# Patient Record
Sex: MALE | Race: OTHER | HISPANIC OR LATINO | Employment: FULL TIME | URBAN - METROPOLITAN AREA
[De-identification: names, ages, dates, MRNs, and addresses within clinical notes are randomized per-mention and may not be internally consistent; named-entity substitution may affect disease eponyms.]

---

## 2022-02-06 ENCOUNTER — HISTORICAL (OUTPATIENT)
Dept: ADMINISTRATIVE | Facility: HOSPITAL | Age: 27
End: 2022-02-06

## 2022-12-21 ENCOUNTER — HOSPITAL ENCOUNTER (EMERGENCY)
Facility: HOSPITAL | Age: 27
Discharge: HOME | End: 2022-12-21
Attending: EMERGENCY MEDICINE

## 2022-12-21 VITALS
OXYGEN SATURATION: 100 % | HEIGHT: 64 IN | HEART RATE: 91 BPM | TEMPERATURE: 98 F | RESPIRATION RATE: 20 BRPM | WEIGHT: 224.44 LBS | BODY MASS INDEX: 38.32 KG/M2 | SYSTOLIC BLOOD PRESSURE: 138 MMHG | DIASTOLIC BLOOD PRESSURE: 75 MMHG

## 2022-12-21 DIAGNOSIS — K75.9 HEPATITIS: ICD-10-CM

## 2022-12-21 DIAGNOSIS — R07.9 CHEST PAIN: ICD-10-CM

## 2022-12-21 DIAGNOSIS — R07.89 CHEST WALL PAIN: Primary | ICD-10-CM

## 2022-12-21 LAB
ALBUMIN SERPL-MCNC: 4 G/DL (ref 3.5–5)
ALBUMIN/GLOB SERPL: 1.1 RATIO (ref 1.1–2)
ALP SERPL-CCNC: 86 UNIT/L (ref 40–150)
ALT SERPL-CCNC: 112 UNIT/L (ref 0–55)
AMPHET UR QL SCN: NEGATIVE
APPEARANCE UR: CLEAR
AST SERPL-CCNC: 42 UNIT/L (ref 5–34)
BACTERIA #/AREA URNS AUTO: ABNORMAL /HPF
BARBITURATE SCN PRESENT UR: NEGATIVE
BASOPHILS # BLD AUTO: 0.09 X10(3)/MCL (ref 0–0.2)
BASOPHILS NFR BLD AUTO: 0.5 %
BENZODIAZ UR QL SCN: NEGATIVE
BILIRUB UR QL STRIP.AUTO: NEGATIVE MG/DL
BILIRUBIN DIRECT+TOT PNL SERPL-MCNC: 0.7 MG/DL
BUN SERPL-MCNC: 13.3 MG/DL (ref 8.9–20.6)
CALCIUM SERPL-MCNC: 10 MG/DL (ref 8.4–10.2)
CANNABINOIDS UR QL SCN: NEGATIVE
CHLORIDE SERPL-SCNC: 98 MMOL/L (ref 98–107)
CO2 SERPL-SCNC: 28 MMOL/L (ref 22–29)
COCAINE UR QL SCN: NEGATIVE
COLOR UR AUTO: COLORLESS
CREAT SERPL-MCNC: 0.79 MG/DL (ref 0.73–1.18)
D DIMER PPP IA.FEU-MCNC: 0.34 UG/ML FEU (ref 0–0.5)
EOSINOPHIL # BLD AUTO: 0.11 X10(3)/MCL (ref 0–0.9)
EOSINOPHIL NFR BLD AUTO: 0.6 %
ERYTHROCYTE [DISTWIDTH] IN BLOOD BY AUTOMATED COUNT: 12.7 % (ref 11.6–14.4)
FENTANYL UR QL SCN: NEGATIVE
GFR SERPLBLD CREATININE-BSD FMLA CKD-EPI: >60 MLS/MIN/1.73/M2
GLOBULIN SER-MCNC: 3.6 GM/DL (ref 2.4–3.5)
GLUCOSE SERPL-MCNC: 166 MG/DL (ref 74–100)
GLUCOSE UR QL STRIP.AUTO: NORMAL MG/DL
HAV IGM SERPL QL IA: NONREACTIVE
HBV CORE IGM SERPL QL IA: NONREACTIVE
HBV SURFACE AG SERPL QL IA: NONREACTIVE
HCT VFR BLD AUTO: 47.7 % (ref 42–52)
HCV AB SERPL QL IA: NONREACTIVE
HGB BLD-MCNC: 16.9 GM/DL (ref 14–18)
HYALINE CASTS #/AREA URNS LPF: ABNORMAL /LPF
IMM GRANULOCYTES # BLD AUTO: 0.18 X10(3)/MCL (ref 0–0.04)
IMM GRANULOCYTES NFR BLD AUTO: 1 %
KETONES UR QL STRIP.AUTO: NEGATIVE MG/DL
LEUKOCYTE ESTERASE UR QL STRIP.AUTO: NEGATIVE UNIT/L
LYMPHOCYTES # BLD AUTO: 3.55 X10(3)/MCL (ref 0.6–4.6)
LYMPHOCYTES NFR BLD AUTO: 19.7 %
MCH RBC QN AUTO: 31.5 PG
MCHC RBC AUTO-ENTMCNC: 35.4 MG/DL (ref 33–36)
MCV RBC AUTO: 88.8 FL (ref 80–94)
MDMA UR QL SCN: NEGATIVE
MONOCYTES # BLD AUTO: 1.36 X10(3)/MCL (ref 0.1–1.3)
MONOCYTES NFR BLD AUTO: 7.5 %
MUCOUS THREADS URNS QL MICRO: ABNORMAL /LPF
NEUTROPHILS # BLD AUTO: 12.76 X10(3)/MCL (ref 2.1–9.2)
NEUTROPHILS NFR BLD AUTO: 70.7 %
NITRITE UR QL STRIP.AUTO: NEGATIVE
NRBC BLD AUTO-RTO: 0 % (ref 0–1)
OPIATES UR QL SCN: NEGATIVE
PCP UR QL: NEGATIVE
PH UR STRIP.AUTO: 7 [PH]
PH UR: 7 [PH] (ref 3–11)
PLATELET # BLD AUTO: 275 X10(3)/MCL (ref 140–371)
PMV BLD AUTO: 10.3 FL (ref 9.4–12.4)
POTASSIUM SERPL-SCNC: 3.8 MMOL/L (ref 3.5–5.1)
PROT SERPL-MCNC: 7.6 GM/DL (ref 6.4–8.3)
PROT UR QL STRIP.AUTO: NEGATIVE MG/DL
RBC # BLD AUTO: 5.37 X10(6)/MCL (ref 4.7–6.1)
RBC #/AREA URNS AUTO: ABNORMAL /HPF
RBC UR QL AUTO: NEGATIVE UNIT/L
SODIUM SERPL-SCNC: 134 MMOL/L (ref 136–145)
SP GR UR STRIP.AUTO: 1.01
SPECIFIC GRAVITY, URINE AUTO (.000) (OHS): 1.01 (ref 1–1.03)
SQUAMOUS #/AREA URNS LPF: ABNORMAL /HPF
TROPONIN I SERPL-MCNC: <0.01 NG/ML (ref 0–0.04)
TROPONIN I SERPL-MCNC: <0.01 NG/ML (ref 0–0.04)
TSH SERPL-ACNC: 1.57 UIU/ML (ref 0.35–4.94)
UROBILINOGEN UR STRIP-ACNC: NORMAL MG/DL
WBC # SPEC AUTO: 18.1 X10(3)/MCL (ref 4.5–11.5)
WBC #/AREA URNS AUTO: ABNORMAL /HPF

## 2022-12-21 PROCEDURE — 84443 ASSAY THYROID STIM HORMONE: CPT | Performed by: EMERGENCY MEDICINE

## 2022-12-21 PROCEDURE — 25000003 PHARM REV CODE 250: Performed by: EMERGENCY MEDICINE

## 2022-12-21 PROCEDURE — 80053 COMPREHEN METABOLIC PANEL: CPT | Performed by: EMERGENCY MEDICINE

## 2022-12-21 PROCEDURE — 85025 COMPLETE CBC W/AUTO DIFF WBC: CPT | Performed by: EMERGENCY MEDICINE

## 2022-12-21 PROCEDURE — 85379 FIBRIN DEGRADATION QUANT: CPT | Performed by: EMERGENCY MEDICINE

## 2022-12-21 PROCEDURE — 84484 ASSAY OF TROPONIN QUANT: CPT | Performed by: EMERGENCY MEDICINE

## 2022-12-21 PROCEDURE — 80074 ACUTE HEPATITIS PANEL: CPT | Performed by: EMERGENCY MEDICINE

## 2022-12-21 PROCEDURE — 99285 EMERGENCY DEPT VISIT HI MDM: CPT | Mod: 25

## 2022-12-21 PROCEDURE — 80307 DRUG TEST PRSMV CHEM ANLYZR: CPT | Performed by: EMERGENCY MEDICINE

## 2022-12-21 PROCEDURE — 81001 URINALYSIS AUTO W/SCOPE: CPT | Performed by: EMERGENCY MEDICINE

## 2022-12-21 PROCEDURE — 93005 ELECTROCARDIOGRAM TRACING: CPT

## 2022-12-21 PROCEDURE — 96360 HYDRATION IV INFUSION INIT: CPT

## 2022-12-21 RX ORDER — ASPIRIN 325 MG
325 TABLET ORAL
Status: COMPLETED | OUTPATIENT
Start: 2022-12-21 | End: 2022-12-21

## 2022-12-21 RX ADMIN — ASPIRIN 325 MG ORAL TABLET 325 MG: 325 PILL ORAL at 11:12

## 2022-12-21 RX ADMIN — SODIUM CHLORIDE 1000 ML: 9 INJECTION, SOLUTION INTRAVENOUS at 11:12

## 2022-12-21 NOTE — Clinical Note
"Christian Woodard" Jose D Cruz was seen and treated in our emergency department on 12/21/2022.  He may return to work on 12/23/2022.       If you have any questions or concerns, please don't hesitate to call.       LPN    "

## 2022-12-21 NOTE — ED PROVIDER NOTES
Encounter Date: 12/21/2022       History     Chief Complaint   Patient presents with    Chest Pain     Left sided chest pain that started yesterday around noon.      27-year-old male presents with concern for left-sided chest pain that began yesterday.  He reports a mild-to-moderate pain that he feels primarily with movement.  He denies any pain or discomfort at present, but demonstrates that he can move his certain way to feel it.  He denies shortness of breath or cough.  He states he has had this type of pain in the past.    Review of patient's allergies indicates:  Not on File  Past Medical History:   Diagnosis Date    Hypertension      History reviewed. No pertinent surgical history.  History reviewed. No pertinent family history.  Social History     Tobacco Use    Smoking status: Never    Smokeless tobacco: Never     Review of Systems   Constitutional: Negative.  Negative for chills and fever.   HENT:  Negative for congestion, rhinorrhea and sore throat.    Eyes:  Negative for visual disturbance.   Respiratory:  Negative for cough and shortness of breath.    Cardiovascular:  Positive for chest pain. Negative for palpitations and leg swelling.   Gastrointestinal:  Negative for abdominal pain, diarrhea, nausea and vomiting.   Genitourinary:  Negative for dysuria, flank pain and frequency.   Musculoskeletal:  Negative for myalgias.   Skin:  Negative for rash.   All other systems reviewed and are negative.    Physical Exam     Initial Vitals [12/21/22 1052]   BP Pulse Resp Temp SpO2   (!) 145/95 (!) 112 18 98.2 °F (36.8 °C) 97 %      MAP       --         Physical Exam    Nursing note and vitals reviewed.  Constitutional: He appears well-developed and well-nourished.   HENT:   Head: Normocephalic and atraumatic.   Eyes: Conjunctivae are normal. Pupils are equal, round, and reactive to light.   Neck: Neck supple.   Normal range of motion.  Cardiovascular:  Normal rate, regular rhythm, normal heart sounds and intact  distal pulses.           Pulmonary/Chest: Breath sounds normal.   Abdominal: Abdomen is soft. Bowel sounds are normal. There is no abdominal tenderness.   Musculoskeletal:         General: No tenderness or edema. Normal range of motion.      Cervical back: Normal range of motion and neck supple.      Comments: Bilateral calves are symmetric, normal in appearance, no tenderness to palpation.     Neurological: He is alert and oriented to person, place, and time.   Skin: Skin is warm and dry.   Psychiatric: He has a normal mood and affect.       ED Course   Procedures  Labs Reviewed   COMPREHENSIVE METABOLIC PANEL - Abnormal; Notable for the following components:       Result Value    Sodium Level 134 (*)     Glucose Level 166 (*)     Globulin 3.6 (*)     Alanine Aminotransferase 112 (*)     Aspartate Aminotransferase 42 (*)     All other components within normal limits   CBC WITH DIFFERENTIAL - Abnormal; Notable for the following components:    WBC 18.1 (*)     Neut # 12.76 (*)     Mono # 1.36 (*)     IG# 0.18 (*)     All other components within normal limits   TROPONIN I - Normal   TROPONIN I - Normal   TSH - Normal   D DIMER, QUANTITATIVE - Normal   CBC W/ AUTO DIFFERENTIAL    Narrative:     The following orders were created for panel order CBC auto differential.  Procedure                               Abnormality         Status                     ---------                               -----------         ------                     CBC with Differential[[id number]]        Abnormal            Final result                 Please view results for these tests on the individual orders.   DRUG SCREEN, URINE (BEAKER)   URINALYSIS, REFLEX TO URINE CULTURE   HEPATITIS PANEL, ACUTE        ECG Results              EKG 12-lead (Chest Pain) Age >30 (In process)  Result time 12/21/22 11:00:56      In process by Interface, Lab In [facility] (12/21/22 11:00:56)                   Narrative:    Test Reason : R07.9,    Vent. Rate : 115  BPM     Atrial Rate : 115 BPM     P-R Int : 134 ms          QRS Dur : 086 ms      QT Int : 318 ms       P-R-T Axes : 030 070 009 degrees     QTc Int : 439 ms    Sinus tachycardia  Otherwise normal ECG  No previous ECGs available    Referred By:             Confirmed By:                                   Imaging Results              X-Ray Chest AP Portable (Final result)  Result time 12/21/22 12:03:04      Final result by Verónica Potts MD (12/21/22 12:03:04)                   Impression:      No acute abnormality of the chest.      Electronically signed by: Verónica Potts  Date:    12/21/2022  Time:    12:03               Narrative:    EXAMINATION:  XR CHEST AP PORTABLE    CLINICAL HISTORY:  Chest Pain;    COMPARISON:  None    FINDINGS:  The heart is normal in size.  The lungs are clear without focal consolidation.  There is no pleural effusion or visible pneumothorax.                                       Medications   aspirin tablet 325 mg (325 mg Oral Given 12/21/22 1133)   sodium chloride 0.9% bolus 1,000 mL 1,000 mL (0 mLs Intravenous Stopped 12/21/22 1215)     Medical Decision Making:   Initial Assessment:   27-year-old Khmer-speaking male presents with left-sided chest pain that is mechanical in nature.  Very low risk based on history, physical exam, lab workup how EKG, and chest x-ray.  Patient denies family history of coronary artery disease, or DVT, nonsmoker.  D-dimer negative.  Laboratory workup does show a mild elevation in transaminase, ALT greater than AST.  On questioning patient about this, he states he does have a history of fatty liver disease, but no knowledge of any other problems in no past history of hepatitis, no bad food exposure, travel, IV drug use.  Discussed with patient that considering he has transaminase elevation and leukocytosis, he should probably avoid any pain meds for his chest pain.  He will receive a follow-up appointment with internal medicine to further evaluate his  hepatitis.  Patient is given return precautions and is agreeable to this plan.                        Clinical Impression:   Final diagnoses:  [R07.9] Chest pain  [R07.89] Chest wall pain (Primary)  [K75.9] Hepatitis        ED Disposition Condition    Discharge Stable          ED Prescriptions    None       Follow-up Information    None          Case Ferrara MD  12/21/22 1404       Case Ferrara MD  01/16/23 [id number]

## 2022-12-22 LAB — PATH REV: NORMAL

## 2023-04-14 DIAGNOSIS — Z13.1 SCREENING FOR DIABETES MELLITUS (DM): ICD-10-CM

## 2023-04-14 DIAGNOSIS — Z13.220 LIPID SCREENING: ICD-10-CM

## 2023-04-14 DIAGNOSIS — Z13.29 THYROID DISORDER SCREEN: ICD-10-CM

## 2023-04-14 DIAGNOSIS — Z13.21 ENCOUNTER FOR VITAMIN DEFICIENCY SCREENING: ICD-10-CM

## 2023-04-14 DIAGNOSIS — Z00.00 ENCOUNTER FOR WELLNESS EXAMINATION IN ADULT: ICD-10-CM

## 2023-04-14 DIAGNOSIS — Z11.3 SCREEN FOR STD (SEXUALLY TRANSMITTED DISEASE): ICD-10-CM

## 2023-04-14 DIAGNOSIS — I10 HYPERTENSION, UNSPECIFIED TYPE: Primary | ICD-10-CM

## 2024-04-09 ENCOUNTER — HOSPITAL ENCOUNTER (EMERGENCY)
Facility: HOSPITAL | Age: 29
Discharge: HOME | End: 2024-04-09
Attending: INTERNAL MEDICINE

## 2024-04-09 VITALS
OXYGEN SATURATION: 96 % | TEMPERATURE: 98 F | SYSTOLIC BLOOD PRESSURE: 126 MMHG | WEIGHT: 209.44 LBS | BODY MASS INDEX: 35.95 KG/M2 | HEART RATE: 90 BPM | DIASTOLIC BLOOD PRESSURE: 74 MMHG | RESPIRATION RATE: 18 BRPM

## 2024-04-09 DIAGNOSIS — R20.2 PARESTHESIAS: ICD-10-CM

## 2024-04-09 DIAGNOSIS — E83.39 HYPOPHOSPHATEMIA: ICD-10-CM

## 2024-04-09 DIAGNOSIS — E87.6 HYPOKALEMIA: Primary | ICD-10-CM

## 2024-04-09 DIAGNOSIS — E83.42 HYPOMAGNESEMIA: ICD-10-CM

## 2024-04-09 LAB
ALBUMIN SERPL-MCNC: 4.1 G/DL (ref 3.5–5)
ALBUMIN/GLOB SERPL: 1.2 RATIO (ref 1.1–2)
ALP SERPL-CCNC: 91 UNIT/L (ref 40–150)
ALT SERPL-CCNC: 30 UNIT/L (ref 0–55)
AST SERPL-CCNC: 18 UNIT/L (ref 5–34)
BILIRUB SERPL-MCNC: 0.7 MG/DL
BUN SERPL-MCNC: 7.5 MG/DL (ref 8.9–20.6)
CALCIUM SERPL-MCNC: 10.1 MG/DL (ref 8.4–10.2)
CHLORIDE SERPL-SCNC: 97 MMOL/L (ref 98–107)
CO2 SERPL-SCNC: 28 MMOL/L (ref 22–29)
CREAT SERPL-MCNC: 0.72 MG/DL (ref 0.73–1.18)
GFR SERPLBLD CREATININE-BSD FMLA CKD-EPI: >60 MLS/MIN/1.73/M2
GLOBULIN SER-MCNC: 3.5 GM/DL (ref 2.4–3.5)
GLUCOSE SERPL-MCNC: 101 MG/DL (ref 74–100)
HOLD SPECIMEN: NORMAL
MAGNESIUM SERPL-MCNC: 1.7 MG/DL (ref 1.6–2.6)
PHOSPHATE SERPL-MCNC: 1.4 MG/DL (ref 2.3–4.7)
POTASSIUM SERPL-SCNC: 3.1 MMOL/L (ref 3.5–5.1)
PROT SERPL-MCNC: 7.6 GM/DL (ref 6.4–8.3)
SODIUM SERPL-SCNC: 138 MMOL/L (ref 136–145)

## 2024-04-09 PROCEDURE — 80053 COMPREHEN METABOLIC PANEL: CPT | Performed by: NURSE PRACTITIONER

## 2024-04-09 PROCEDURE — 25000003 PHARM REV CODE 250: Performed by: PHYSICIAN ASSISTANT

## 2024-04-09 PROCEDURE — 96365 THER/PROPH/DIAG IV INF INIT: CPT

## 2024-04-09 PROCEDURE — 96366 THER/PROPH/DIAG IV INF ADDON: CPT

## 2024-04-09 PROCEDURE — 99284 EMERGENCY DEPT VISIT MOD MDM: CPT | Mod: 25

## 2024-04-09 PROCEDURE — 82962 GLUCOSE BLOOD TEST: CPT

## 2024-04-09 PROCEDURE — 63600175 PHARM REV CODE 636 W HCPCS: Performed by: INTERNAL MEDICINE

## 2024-04-09 PROCEDURE — 83735 ASSAY OF MAGNESIUM: CPT | Performed by: NURSE PRACTITIONER

## 2024-04-09 PROCEDURE — 84100 ASSAY OF PHOSPHORUS: CPT | Performed by: NURSE PRACTITIONER

## 2024-04-09 PROCEDURE — 25000003 PHARM REV CODE 250: Performed by: INTERNAL MEDICINE

## 2024-04-09 RX ORDER — SODIUM,POTASSIUM PHOSPHATES 280-250MG
1 POWDER IN PACKET (EA) ORAL ONCE
Status: DISCONTINUED | OUTPATIENT
Start: 2024-04-09 | End: 2024-04-09

## 2024-04-09 RX ORDER — SODIUM CHLORIDE 9 MG/ML
INJECTION, SOLUTION INTRAVENOUS CONTINUOUS
Status: DISCONTINUED | OUTPATIENT
Start: 2024-04-09 | End: 2024-04-09 | Stop reason: HOSPADM

## 2024-04-09 RX ORDER — MAGNESIUM SULFATE HEPTAHYDRATE 40 MG/ML
2 INJECTION, SOLUTION INTRAVENOUS
Status: COMPLETED | OUTPATIENT
Start: 2024-04-09 | End: 2024-04-09

## 2024-04-09 RX ORDER — SODIUM,POTASSIUM PHOSPHATES 280-250MG
1 POWDER IN PACKET (EA) ORAL ONCE
Status: COMPLETED | OUTPATIENT
Start: 2024-04-09 | End: 2024-04-09

## 2024-04-09 RX ADMIN — POTASSIUM BICARBONATE 50 MEQ: 977.5 TABLET, EFFERVESCENT ORAL at 06:04

## 2024-04-09 RX ADMIN — POTASSIUM & SODIUM PHOSPHATES POWDER PACK 280-160-250 MG 1 PACKET: 280-160-250 PACK at 08:04

## 2024-04-09 RX ADMIN — SODIUM CHLORIDE: 9 INJECTION, SOLUTION INTRAVENOUS at 06:04

## 2024-04-09 RX ADMIN — MAGNESIUM SULFATE HEPTAHYDRATE 2 G: 40 INJECTION, SOLUTION INTRAVENOUS at 06:04

## 2024-04-09 NOTE — ED PROVIDER NOTES
Encounter Date: 4/9/2024       History     Chief Complaint   Patient presents with    Numbness     PT W CO HANDS AND FEET FEELING NUMB SINCE THIS AM.  PT REPORTS ANXIETY.  CBG 94.      27 yo male C/O numbness to hands and feet. He is short of breath and complaining of nervousness and anxiety.    The history is provided by the patient. The history is limited by a language barrier. A  was used.     Review of patient's allergies indicates:  No Known Allergies  Past Medical History:   Diagnosis Date    Hypertension      History reviewed. No pertinent surgical history.  History reviewed. No pertinent family history.  Social History     Tobacco Use    Smoking status: Never    Smokeless tobacco: Never   Substance Use Topics    Alcohol use: Yes     Comment: OCCATIONALLY    Drug use: Not Currently     Review of Systems   Constitutional:  Negative for fever.   HENT:  Negative for sore throat.    Respiratory:  Negative for shortness of breath.    Cardiovascular:  Negative for chest pain.   Gastrointestinal:  Negative for nausea.   Genitourinary:  Negative for dysuria.   Musculoskeletal:  Negative for back pain.   Skin:  Negative for rash.   Neurological:  Positive for numbness. Negative for weakness.   Hematological:  Does not bruise/bleed easily.   Psychiatric/Behavioral:  Positive for agitation. The patient is nervous/anxious.        Physical Exam     Initial Vitals [04/09/24 1523]   BP Pulse Resp Temp SpO2   135/82 105 16 97.9 °F (36.6 °C) 100 %      MAP       --         Physical Exam    Nursing note and vitals reviewed.  Constitutional: He appears well-developed and well-nourished. No distress.   HENT:   Head: Normocephalic and atraumatic.   Eyes: Pupils are equal, round, and reactive to light.   Neck: Neck supple.   Normal range of motion.  Cardiovascular:  Normal rate, regular rhythm, normal heart sounds and intact distal pulses.           Pulmonary/Chest: Breath sounds normal. Tachypnea noted.    Abdominal: Abdomen is soft. Bowel sounds are normal.   Musculoskeletal:         General: Normal range of motion.      Cervical back: Normal range of motion and neck supple.     Neurological: He is alert and oriented to person, place, and time. He has normal strength. GCS score is 15. GCS eye subscore is 4. GCS verbal subscore is 5. GCS motor subscore is 6.   Skin: Skin is warm and dry.   Psychiatric: Thought content normal.         ED Course   Procedures  Labs Reviewed   COMPREHENSIVE METABOLIC PANEL - Abnormal; Notable for the following components:       Result Value    Potassium Level 3.1 (*)     Chloride 97 (*)     Glucose Level 101 (*)     Blood Urea Nitrogen 7.5 (*)     Creatinine 0.72 (*)     All other components within normal limits   PHOSPHORUS - Abnormal; Notable for the following components:    Phosphorus Level 1.4 (*)     All other components within normal limits   MAGNESIUM - Normal   EXTRA TUBES    Narrative:     The following orders were created for panel order EXTRA TUBES.  Procedure                               Abnormality         Status                     ---------                               -----------         ------                     Light Blue Top Hold[[id number]]                              Final result               Red Top Hold[[id number]]                                     Final result               Lavender Top Hold[[id number]]                                Final result                 Please view results for these tests on the individual orders.   LIGHT BLUE TOP HOLD   RED TOP HOLD   LAVENDER TOP HOLD          Imaging Results    None          Medications   0.9%  NaCl infusion (0 mL/hr Intravenous Stopped 4/9/24 2051)   magnesium sulfate 2g in water 50mL IVPB (premix) (0 g Intravenous Stopped 4/9/24 2035)   potassium bicarbonate disintegrating tablet 50 mEq (50 mEq Oral Given 4/9/24 1831)   potassium, sodium phosphates 280-160-250 mg packet 1 packet (1 packet Oral Given 4/9/24 2035)      Medical Decision Making  27 yo male C/O numbness to hands and feet. He is short of breath and complaining of nervousness and anxiety.    DDx:  Electrolyte abnormality, dehydration, anxiety    Patient had a combination of mild hypokalemia, hypomagnesemia, hypophosphatemia with paresthesias, likely due to working outside and heavy alcohol use without enough hydration.  IV magnesium, potassium bicarbonate tablets and K-Phos given with IV fluids.  Patient felt much better afterwards.  Referral sent to Internal Medicine Clinic for him to establish a primary care provider.  I gave strict ED precautions and he will return if needed.  I discussed staying well hydrated and limiting alcohol consumption.    Amount and/or Complexity of Data Reviewed  Labs: ordered. Decision-making details documented in ED Course.    Risk  OTC drugs.  Prescription drug management.      Additional MDM:   Differential Diagnosis:   Electrolyte imbalance, anxiety, carpal tunnel, tarsal tunnel                         5:00 PM at this time transition of care performed. Report on patient given to PILAR Hernandez. Patient stable. Pending to finish workup, and reevaluation for final disposition      It is important that you follow up with your primary care provider or specialist if indicated for further evaluation, workup, and treatment as necessary. The exam and treatment you received in Emergency Department was for an urgent problem and NOT INTENDED AS COMPLETE CARE. It is important that you FOLLOW UP with a doctor for ongoing care. If your symptoms become WORSE or you DO NOT IMPROVE and you are unable to reach your health care provider, you should RETURN to the Emergency Department       Clinical Impression:  Final diagnoses:  [E87.6] Hypokalemia (Primary)  [E83.42] Hypomagnesemia  [E83.39] Hypophosphatemia  [R20.2] Paresthesias          ED Disposition Condition    Discharge Stable          ED Prescriptions    None       Follow-up Information        Follow up With Specialties Details Why Contact Info Additional Information    [facility] Emergency Dept Emergency Medicine  As needed, If symptoms worsen [address] [facility] [address] [phone]     [facility] Internal Medicine Internal Medicine Call   [address] [facility] [address] [phone] Internal Medicine Clinic Entrance #1             Felicia Ashford PA  04/09/24 2237       Felicia Ashford PA  04/09/24 2237

## 2024-04-10 LAB — POCT GLUCOSE: 94 MG/DL (ref 70–110)

## 2024-04-15 ENCOUNTER — HOSPITAL ENCOUNTER (EMERGENCY)
Facility: HOSPITAL | Age: 29
Discharge: HOME | End: 2024-04-15
Attending: INTERNAL MEDICINE

## 2024-04-15 VITALS
SYSTOLIC BLOOD PRESSURE: 126 MMHG | OXYGEN SATURATION: 99 % | TEMPERATURE: 99 F | DIASTOLIC BLOOD PRESSURE: 80 MMHG | BODY MASS INDEX: 36.33 KG/M2 | WEIGHT: 211.63 LBS | RESPIRATION RATE: 18 BRPM | HEART RATE: 85 BPM

## 2024-04-15 DIAGNOSIS — R07.9 CHEST PAIN: ICD-10-CM

## 2024-04-15 DIAGNOSIS — R07.89 ATYPICAL CHEST PAIN: Primary | ICD-10-CM

## 2024-04-15 LAB
ANION GAP SERPL CALC-SCNC: 11 MEQ/L
BUN SERPL-MCNC: 9.2 MG/DL (ref 8.9–20.6)
CALCIUM SERPL-MCNC: 9.6 MG/DL (ref 8.4–10.2)
CHLORIDE SERPL-SCNC: 104 MMOL/L (ref 98–107)
CO2 SERPL-SCNC: 22 MMOL/L (ref 22–29)
CREAT SERPL-MCNC: 0.74 MG/DL (ref 0.73–1.18)
CREAT/UREA NIT SERPL: 12
GFR SERPLBLD CREATININE-BSD FMLA CKD-EPI: >60 MLS/MIN/1.73/M2
GLUCOSE SERPL-MCNC: 104 MG/DL (ref 74–100)
HOLD SPECIMEN: NORMAL
POCT GLUCOSE: 112 MG/DL (ref 70–110)
POTASSIUM SERPL-SCNC: 3.1 MMOL/L (ref 3.5–5.1)
SODIUM SERPL-SCNC: 137 MMOL/L (ref 136–145)

## 2024-04-15 PROCEDURE — 93005 ELECTROCARDIOGRAM TRACING: CPT

## 2024-04-15 PROCEDURE — 99284 EMERGENCY DEPT VISIT MOD MDM: CPT | Mod: 25

## 2024-04-15 PROCEDURE — 82962 GLUCOSE BLOOD TEST: CPT

## 2024-04-15 PROCEDURE — 80048 BASIC METABOLIC PNL TOTAL CA: CPT | Performed by: INTERNAL MEDICINE

## 2024-04-15 NOTE — ED PROVIDER NOTES
"Encounter Date: 4/15/2024       History     Chief Complaint   Patient presents with    Chest Pain     Pt reports headache, chest pain, sob, dry mouth, "feeling desperate" since last week. Denies drug use or SI.      Presents with chest pain and having his mouth dry. States Hx of HTN and HLD. Denies polyuria or polydipsia.     The history is provided by the patient.     Review of patient's allergies indicates:  No Known Allergies  Past Medical History:   Diagnosis Date    Hypertension      No past surgical history on file.  No family history on file.  Social History     Tobacco Use    Smoking status: Never    Smokeless tobacco: Never   Substance Use Topics    Alcohol use: Yes     Comment: OCCATIONALLY    Drug use: Not Currently     Review of Systems   Cardiovascular:  Positive for chest pain.   Endocrine: Positive for polydipsia.   All other systems reviewed and are negative.      Physical Exam     Initial Vitals [04/15/24 1539]   BP Pulse Resp Temp SpO2   126/80 85 18 98.6 °F (37 °C) 99 %      MAP       --         Physical Exam    Nursing note and vitals reviewed.  Constitutional: He appears well-developed and well-nourished. No distress.   HENT:   Head: Normocephalic and atraumatic.   Mouth/Throat: Oropharynx is clear and moist.   Eyes: Conjunctivae and EOM are normal. Pupils are equal, round, and reactive to light.   Neck: Neck supple. No thyromegaly present. No JVD present.   Normal range of motion.  Cardiovascular:  Normal rate, regular rhythm, normal heart sounds and intact distal pulses.           Pulmonary/Chest: Breath sounds normal. No respiratory distress.   Abdominal: Abdomen is soft. Bowel sounds are normal. He exhibits no distension. There is no abdominal tenderness. There is no rebound and no guarding.   Musculoskeletal:         General: No edema. Normal range of motion.      Cervical back: Normal range of motion and neck supple.     Neurological: He is alert and oriented to person, place, and time. He " has normal strength. GCS score is 15. GCS eye subscore is 4. GCS verbal subscore is 5. GCS motor subscore is 6.   Skin: Skin is warm and dry. No rash noted.   Psychiatric: His behavior is normal.         ED Course   Procedures  Labs Reviewed   BASIC METABOLIC PANEL - Abnormal; Notable for the following components:       Result Value    Potassium Level 3.1 (*)     Glucose Level 104 (*)     All other components within normal limits   POCT GLUCOSE - Abnormal; Notable for the following components:    POCT Glucose 112 (*)     All other components within normal limits   EXTRA TUBES    Narrative:     The following orders were created for panel order EXTRA TUBES.  Procedure                               Abnormality         Status                     ---------                               -----------         ------                     Light Blue Top Hold[[id number]]                             In process                 Lavender Top Hold[[id number]]                               In process                 Gold Top Hold[[id number]]                                   In process                   Please view results for these tests on the individual orders.   LIGHT BLUE TOP HOLD   LAVENDER TOP HOLD   GOLD TOP HOLD   POCT GLUCOSE, HAND-HELD DEVICE     EKG Readings: (Independently Interpreted)   Initial Reading: No STEMI. Rhythm: Normal Sinus Rhythm. Heart Rate: 86. Ectopy: No Ectopy. Conduction: Normal. ST Segments: Normal ST Segments. T Waves: Normal. Clinical Impression: Normal Sinus Rhythm     ECG Results              EKG 12-lead (Chest Pain) Age >30 (In process)        Collection Time Result Time QRS Duration [facility] QTC Calculation    04/15/24 16:06:14 04/15/24 16:58:06 96 442                     In process by Interface, Lab In [facility] (04/15/24 16:58:10)                   Narrative:    Test Reason : R07.9,    Vent. Rate : 086 BPM     Atrial Rate : 086 BPM     P-R Int : 142 ms          QRS Dur : 096 ms      QT Int : 370 ms        P-R-T Axes : 009 076 009 degrees     QTc Int : 442 ms    Normal sinus rhythm  Normal ECG  When compared with ECG of 21-DEC-2022 10:58,  No significant change was found    Referred By: [email or web address]   SELF           Confirmed By:                                   Imaging Results    None          Medications - No data to display  Medical Decision Making  Amount and/or Complexity of Data Reviewed  Labs: ordered. Decision-making details documented in ED Course.  ECG/medicine tests: ordered and independent interpretation performed. Decision-making details documented in ED Course.      Additional MDM:   Differential Diagnosis:   Miocardial infarction, pneumothorax, aortic dissection, pulmonary emboli, pneumonia, among others                                      Clinical Impression:  Final diagnoses:  [R07.9] Chest pain  [R07.89] Atypical chest pain (Primary)          ED Disposition Condition    Discharge Stable          ED Prescriptions    None       Follow-up Information       Follow up With Specialties Details Why Contact Info Additional Information    [facility] Emergency Dept Emergency Medicine  If symptoms worsen [facility] [facility] [address] [phone]     [facility] Internal Medicine Schedule an appointment as soon as possible for a visit in 2 months  [facility] [facility] [address] [phone] Internal Medicine Clinic Entrance #1             Willie Westfall MD  04/15/24 1953

## 2024-04-16 LAB
OHS QRS DURATION: 96 MS
OHS QTC CALCULATION: 442 MS